# Patient Record
Sex: FEMALE | Race: WHITE | Employment: UNEMPLOYED | URBAN - METROPOLITAN AREA
[De-identification: names, ages, dates, MRNs, and addresses within clinical notes are randomized per-mention and may not be internally consistent; named-entity substitution may affect disease eponyms.]

---

## 2021-12-21 ENCOUNTER — TELEPHONE (OUTPATIENT)
Dept: FAMILY MEDICINE CLINIC | Age: 71
End: 2021-12-21

## 2021-12-21 NOTE — TELEPHONE ENCOUNTER
I accepted a phone call from [place]  regarding this patient. I was listed as PCP but I have never seen the patient. It appears that she was seen one time through [facility] primary care in 2014 by a physician that worked for [facility] and was diagnosed with cellulitis. There is no evidence of lab test/imaging available.  is made aware of this.     Electronically signed by JAZLYN Brady - CNP-CNP, 3:09 PM @ANNA MARIE@